# Patient Record
Sex: MALE | Race: WHITE
[De-identification: names, ages, dates, MRNs, and addresses within clinical notes are randomized per-mention and may not be internally consistent; named-entity substitution may affect disease eponyms.]

---

## 2021-05-10 ENCOUNTER — HOSPITAL ENCOUNTER (OUTPATIENT)
Dept: HOSPITAL 61 - PNCL | Age: 41
Discharge: HOME | End: 2021-05-10
Attending: ANESTHESIOLOGY
Payer: COMMERCIAL

## 2021-05-10 DIAGNOSIS — K21.9: ICD-10-CM

## 2021-05-10 DIAGNOSIS — M79.604: ICD-10-CM

## 2021-05-10 DIAGNOSIS — Z79.899: ICD-10-CM

## 2021-05-10 DIAGNOSIS — Z98.890: ICD-10-CM

## 2021-05-10 DIAGNOSIS — M19.90: ICD-10-CM

## 2021-05-10 DIAGNOSIS — M54.5: Primary | ICD-10-CM

## 2021-05-10 DIAGNOSIS — M79.605: ICD-10-CM

## 2021-05-10 DIAGNOSIS — I10: ICD-10-CM

## 2021-05-10 DIAGNOSIS — F41.9: ICD-10-CM

## 2021-05-10 PROCEDURE — 99214 OFFICE O/P EST MOD 30 MIN: CPT

## 2021-05-10 PROCEDURE — G0463 HOSPITAL OUTPT CLINIC VISIT: HCPCS

## 2021-05-10 NOTE — PDOC1
INITIAL PAIN CONSULT


DATE OF SERVICE:


DOS:


DATE: 5/10/21 


TIME: 12:39





CHIEF COMPLAINT:


Chief Complaint:


Low back and bilateral lower extremity pain





HISTORY OF PRESENT ILLNESS:


40-year-old male presents history of pain low back bilateral lower extremities 

for about 5 years not result of any specific injury or accident that he is aware

but increasing with time and radiating posterior gluteus across the low back 

bilaterally right essentially equal to left and into the thighs posteriorly as 

well patient reports is worse with walking standing changing positions better 

with sitting or laying down but does awaken from sleep least once a night 

patient reports is not effective bowel bladder control or his ability to walk.  

Patient reports is a constant pain described as aching and shooting and stabbing

the low back and bilateral lower extremities intermittent intensity worse with 

activity especially standing walking changes during the day with activity 

tingling across the back and the legs radiating the posterior aspect of the 

thighs as well as an aching in the back itself.  Patient rates his disability 

rating 0-10 10 being the worst as a 6 with family home responsibilities and 

recreation and sexual behavior 8 with occupation 3 with social activity and 

self-care and/support activities.  Patient have an MRI scan lumbar spine showing

L4-5 with mild disc bulge and mild foraminal narrowing L5-S1 shows a disc bulge 

with a right protrusion extending from paracentral to foraminal with associated 

annular fissure measuring 0.3 cm in height with a protrusion type herniation on 

the right at L5-S1.  Patient reports no loss of motor function lower extremities

but easy fatigability of both legs with activity especially with working.  

Patient reports he been off work the last 2weeks the pain is not since gotten 

significantly improved.  Patient has been having chiropractic treatments which 

are helpful also taking hydrocodone which has been helpful as well but not long-

lasting on these modalities.





PAST MEDICAL HISTORY:


PMH:


Hypertension, arthritis, anxiety, gastroesophageal reflux





PREVIOUS SURGERIES:


Past Surgical Hx:


None





FAMILY HISTORY:


Family Hx:


Arthritis, low back issues in patient's father





SOCIAL HISTORY:


Social Hx:


Patient drinks about a sixpack of beer every 2 weeks chews tobacco does not 

smoke has been for 5 years does not use any illegal illicit or recreational 

drugs is  lives with his spouse has 1 child living at home lives locally 

in [place] works as a [employer]





REVIEW OF SYSTEMS:


ROS:


Positive for those items mentioned in history of present illness, all systems 

are reviewed, otherwise negative ,and are complete full and well-documented on 

patient's chart.





PHYSICAL EXAM:


VS:


Blood pressure is 168/1 9 pulse 77 respirations 16 temperature 98.4 F height 5 

feet 10 inches weight is 236 pounds


PE:


PHYSICAL EXAMINATION:





GENERAL: The patient is awake, alert, oriented, appropriate, very pleasant 

demeanor


HEENT: Shows normocephalic, atraumatic.  Extraocular movements are intact and 

symmetrical.  Oral cavity: Mucous membranes moist and pink.  Dentition is 

intact.


NECK: Shows anterior throat supple without palpable lymphadenopathy noted.  

Swallow reflex symmetrical.


CHEST: Shows normal on inspection.  Breath sounds are clear bilaterally, distant

but no rales rhonchi or wheezes auscultated.


HEART: Shows S1, S2 clear.  No murmurs auscultated.


ABDOMEN: Soft, nontender, nondistended, obese.  No palpable organomegaly is 

noted.  No rebound or guarding demonstrated.


BACK: Shows spine grossly in the midline.  Normal-appearing cervical lordotic 

curvature.  There is slightly increased thoracic kyphosis, some minor flattening

of the lumbar lordotic curvature.  Lumbar paraspinous muscles show symmetrical 

on inspection, on palpation shows some moderate tenderness diffusely throughout 

the upper, middle and lower distribution of the paraspinous muscles bilaterally 

and also into the lower thoracic paraspinous musculature, firm and tender, but 

without specific trigger points, without radiation of pain.  The patient has 

good rotational motion of the lumbar spine, both laterally as well as extension 

and flexion without significant difficulty.  No tenderness over the spinous 

processes, sacrum or sacroiliac regions.


EXTREMITIES: Lower extremities show deep tendon reflexes 2+ in the patellar and 

tendo calcaneus tendons.  Motor exam is 5 on a scale of 5 with right 

dorsiflexion, extension, quadriceps and hamstring flexion and 5/5 on the left.  

Peripheral pulses are 1+ posterior tibial.  No peripheral edema is noted 

bilaterally.  Lower extremities are warm and dry to touch, equal in color and 

appearance.  Straight leg raise noted to be negative bilaterally.  Gaenslen's 

and Harry's maneuvers are negative bilaterally as well.  The patient is able 

to stand, stand on his toes without significant difficulty or loss of balance 

walks with a normal-appearing gait is not appear to favor the right or left 

lower extremity significantly with ambulation.


SKIN: Shows warm and dry, good turgor.  No edema.  No sores, rashes or bruising 

throughout.





IMPRESSION:


Impression:


40-year-old male with long history approximately 6 years low back and bilateral 

lower extremity pain


MRI scan lumbar spine as noted


Arthritis


Hypertension





Plan: Options were discussed with the patient occluding conservative medical 

management physical therapies interventional techniques.  We discussed 

procedures today using descriptions as well as anatomical models to describe the

procedures.  Patient would like to consider all of his options and discuss this 

with his spouse later and will decide whether he would like to proceed with 

interventional techniques.  Patient will follow up if desires interventional 

techniques and will plan on lumbar epidural steroid injection at that time.











MIRELLA BERNABE MD               May 10, 2021 12:47

## 2024-03-27 ENCOUNTER — APPOINTMENT (RX ONLY)
Dept: URBAN - METROPOLITAN AREA CLINIC 39 | Facility: CLINIC | Age: 44
Setting detail: DERMATOLOGY
End: 2024-03-27

## 2024-03-27 DIAGNOSIS — L91.8 OTHER HYPERTROPHIC DISORDERS OF THE SKIN: ICD-10-CM

## 2024-03-27 PROCEDURE — ? LIQUID NITROGEN (COSMETIC)

## 2024-03-27 PROCEDURE — ? COUNSELING

## 2024-03-27 ASSESSMENT — LOCATION DETAILED DESCRIPTION DERM
LOCATION DETAILED: RIGHT MEDIAL INFERIOR EYELID
LOCATION DETAILED: LEFT MEDIAL ZYGOMA
LOCATION DETAILED: LEFT FOREHEAD
LOCATION DETAILED: LEFT INFERIOR POSTERIOR NECK
LOCATION DETAILED: LEFT CENTRAL EYEBROW
LOCATION DETAILED: LEFT CLAVICULAR NECK
LOCATION DETAILED: LEFT MEDIAL MALAR CHEEK
LOCATION DETAILED: LEFT INFERIOR LATERAL FOREHEAD
LOCATION DETAILED: LEFT INFERIOR ANTERIOR NECK
LOCATION DETAILED: LEFT CENTRAL TEMPLE

## 2024-03-27 ASSESSMENT — LOCATION SIMPLE DESCRIPTION DERM
LOCATION SIMPLE: LEFT ANTERIOR NECK
LOCATION SIMPLE: LEFT TEMPLE
LOCATION SIMPLE: RIGHT INFERIOR EYELID
LOCATION SIMPLE: LEFT CHEEK
LOCATION SIMPLE: LEFT ZYGOMA
LOCATION SIMPLE: LEFT FOREHEAD
LOCATION SIMPLE: LEFT EYEBROW
LOCATION SIMPLE: POSTERIOR NECK

## 2024-03-27 ASSESSMENT — LOCATION ZONE DERM
LOCATION ZONE: FACE
LOCATION ZONE: EYELID
LOCATION ZONE: NECK

## 2024-03-27 NOTE — PROCEDURE: LIQUID NITROGEN (COSMETIC)
Billing Information: Bill by Static Price
Spray Paint Technique: No
Consent: The patient's consent was obtained including but not limited to risks of crusting, scabbing, blistering, scarring, darker or lighter pigmentary change, recurrence, incomplete removal and infection. The patient understands that the procedure is cosmetic in nature and is not covered by insurance.
Post-Care Instructions: I reviewed with the patient in detail post-care instructions. Patient is to wear sunprotection, and avoid picking at any of the treated lesions. Pt may apply Vaseline to crusted or scabbing areas.
Price (Use Numbers Only, No Special Characters Or $): 304
Detail Level: Detailed
Show Spray Paint Technique Variable?: Yes
Spray Paint Text: The liquid nitrogen was applied to the skin utilizing a spray paint frosting technique.